# Patient Record
Sex: FEMALE | Race: OTHER | URBAN - METROPOLITAN AREA
[De-identification: names, ages, dates, MRNs, and addresses within clinical notes are randomized per-mention and may not be internally consistent; named-entity substitution may affect disease eponyms.]

---

## 2017-05-02 ENCOUNTER — EMERGENCY (EMERGENCY)
Facility: HOSPITAL | Age: 62
LOS: 1 days | Discharge: HOME | End: 2017-05-02
Attending: EMERGENCY MEDICINE | Admitting: EMERGENCY MEDICINE
Payer: COMMERCIAL

## 2017-05-02 VITALS
TEMPERATURE: 98 F | HEART RATE: 92 BPM | SYSTOLIC BLOOD PRESSURE: 174 MMHG | RESPIRATION RATE: 18 BRPM | DIASTOLIC BLOOD PRESSURE: 96 MMHG | OXYGEN SATURATION: 97 %

## 2017-05-02 VITALS
TEMPERATURE: 98 F | SYSTOLIC BLOOD PRESSURE: 145 MMHG | RESPIRATION RATE: 18 BRPM | DIASTOLIC BLOOD PRESSURE: 91 MMHG | HEART RATE: 72 BPM | OXYGEN SATURATION: 98 %

## 2017-05-02 DIAGNOSIS — I77.74 DISSECTION OF VERTEBRAL ARTERY: ICD-10-CM

## 2017-05-02 DIAGNOSIS — Z79.899 OTHER LONG TERM (CURRENT) DRUG THERAPY: ICD-10-CM

## 2017-05-02 DIAGNOSIS — R42 DIZZINESS AND GIDDINESS: ICD-10-CM

## 2017-05-02 DIAGNOSIS — R51 HEADACHE: ICD-10-CM

## 2017-05-02 LAB
ALBUMIN SERPL ELPH-MCNC: 4.1 G/DL — SIGNIFICANT CHANGE UP (ref 3.4–5)
ALP SERPL-CCNC: 62 U/L — SIGNIFICANT CHANGE UP (ref 40–120)
ALT FLD-CCNC: 27 U/L — SIGNIFICANT CHANGE UP (ref 12–42)
ANION GAP SERPL CALC-SCNC: 7 MMOL/L — LOW (ref 9–16)
APPEARANCE UR: CLEAR — SIGNIFICANT CHANGE UP
AST SERPL-CCNC: 19 U/L — SIGNIFICANT CHANGE UP (ref 15–37)
BASOPHILS NFR BLD AUTO: 0.8 % — SIGNIFICANT CHANGE UP (ref 0–2)
BILIRUB SERPL-MCNC: 0.7 MG/DL — SIGNIFICANT CHANGE UP (ref 0.2–1.2)
BILIRUB UR-MCNC: NEGATIVE — SIGNIFICANT CHANGE UP
BUN SERPL-MCNC: 11 MG/DL — SIGNIFICANT CHANGE UP (ref 7–23)
CALCIUM SERPL-MCNC: 8.8 MG/DL — SIGNIFICANT CHANGE UP (ref 8.5–10.5)
CHLORIDE SERPL-SCNC: 107 MMOL/L — SIGNIFICANT CHANGE UP (ref 96–108)
CO2 SERPL-SCNC: 26 MMOL/L — SIGNIFICANT CHANGE UP (ref 22–31)
COLOR SPEC: YELLOW — SIGNIFICANT CHANGE UP
CREAT SERPL-MCNC: 0.69 MG/DL — SIGNIFICANT CHANGE UP (ref 0.5–1.3)
D DIMER BLD IA.RAPID-MCNC: <150 NG/ML DDU — SIGNIFICANT CHANGE UP
DIFF PNL FLD: NEGATIVE — SIGNIFICANT CHANGE UP
EOSINOPHIL NFR BLD AUTO: 3.2 % — SIGNIFICANT CHANGE UP (ref 0–6)
EXTRA BLUE TOP TUBE: SIGNIFICANT CHANGE UP
EXTRA SST TUBE: SIGNIFICANT CHANGE UP
GLUCOSE SERPL-MCNC: 101 MG/DL — HIGH (ref 70–99)
GLUCOSE UR QL: NEGATIVE — SIGNIFICANT CHANGE UP
HCT VFR BLD CALC: 42.3 % — SIGNIFICANT CHANGE UP (ref 34.5–45)
HGB BLD-MCNC: 15.5 G/DL — SIGNIFICANT CHANGE UP (ref 11.5–15.5)
KETONES UR-MCNC: NEGATIVE — SIGNIFICANT CHANGE UP
LEUKOCYTE ESTERASE UR-ACNC: NEGATIVE — SIGNIFICANT CHANGE UP
LYMPHOCYTES # BLD AUTO: 26.8 % — SIGNIFICANT CHANGE UP (ref 13–44)
MCHC RBC-ENTMCNC: 30.8 PG — SIGNIFICANT CHANGE UP (ref 27–34)
MCHC RBC-ENTMCNC: 36.6 G/DL — HIGH (ref 32–36)
MCV RBC AUTO: 84.1 FL — SIGNIFICANT CHANGE UP (ref 80–100)
MONOCYTES NFR BLD AUTO: 7.6 % — SIGNIFICANT CHANGE UP (ref 2–14)
NEUTROPHILS NFR BLD AUTO: 61.6 % — SIGNIFICANT CHANGE UP (ref 43–77)
NITRITE UR-MCNC: NEGATIVE — SIGNIFICANT CHANGE UP
PH UR: 7 — SIGNIFICANT CHANGE UP (ref 5–8)
PLATELET # BLD AUTO: 257 K/UL — SIGNIFICANT CHANGE UP (ref 150–400)
POTASSIUM SERPL-MCNC: 3.4 MMOL/L — LOW (ref 3.5–5.3)
POTASSIUM SERPL-SCNC: 3.4 MMOL/L — LOW (ref 3.5–5.3)
PROT SERPL-MCNC: 7.3 G/DL — SIGNIFICANT CHANGE UP (ref 6.4–8.2)
PROT UR-MCNC: NEGATIVE MG/DL — SIGNIFICANT CHANGE UP
RBC # BLD: 5.03 M/UL — SIGNIFICANT CHANGE UP (ref 3.8–5.2)
RBC # FLD: 13.5 % — SIGNIFICANT CHANGE UP (ref 10.3–16.9)
SODIUM SERPL-SCNC: 140 MMOL/L — SIGNIFICANT CHANGE UP (ref 135–145)
SP GR SPEC: 1.01 — SIGNIFICANT CHANGE UP (ref 1–1.03)
UROBILINOGEN FLD QL: 0.2 E.U./DL — SIGNIFICANT CHANGE UP
WBC # BLD: 5 K/UL — SIGNIFICANT CHANGE UP (ref 3.8–10.5)
WBC # FLD AUTO: 5 K/UL — SIGNIFICANT CHANGE UP (ref 3.8–10.5)

## 2017-05-02 PROCEDURE — 80053 COMPREHEN METABOLIC PANEL: CPT

## 2017-05-02 PROCEDURE — 85379 FIBRIN DEGRADATION QUANT: CPT

## 2017-05-02 PROCEDURE — 70551 MRI BRAIN STEM W/O DYE: CPT

## 2017-05-02 PROCEDURE — 80061 LIPID PANEL: CPT

## 2017-05-02 PROCEDURE — 99284 EMERGENCY DEPT VISIT MOD MDM: CPT | Mod: 25

## 2017-05-02 PROCEDURE — 71010: CPT | Mod: 26

## 2017-05-02 PROCEDURE — 70450 CT HEAD/BRAIN W/O DYE: CPT | Mod: 26

## 2017-05-02 PROCEDURE — 84484 ASSAY OF TROPONIN QUANT: CPT

## 2017-05-02 PROCEDURE — 93005 ELECTROCARDIOGRAM TRACING: CPT

## 2017-05-02 PROCEDURE — 99285 EMERGENCY DEPT VISIT HI MDM: CPT

## 2017-05-02 PROCEDURE — 70551 MRI BRAIN STEM W/O DYE: CPT | Mod: 26

## 2017-05-02 PROCEDURE — 82550 ASSAY OF CK (CPK): CPT

## 2017-05-02 PROCEDURE — 70498 CT ANGIOGRAPHY NECK: CPT

## 2017-05-02 PROCEDURE — 70498 CT ANGIOGRAPHY NECK: CPT | Mod: 26

## 2017-05-02 PROCEDURE — 71045 X-RAY EXAM CHEST 1 VIEW: CPT

## 2017-05-02 PROCEDURE — 70450 CT HEAD/BRAIN W/O DYE: CPT

## 2017-05-02 PROCEDURE — 99285 EMERGENCY DEPT VISIT HI MDM: CPT | Mod: 25

## 2017-05-02 PROCEDURE — 93010 ELECTROCARDIOGRAM REPORT: CPT

## 2017-05-02 PROCEDURE — 81003 URINALYSIS AUTO W/O SCOPE: CPT

## 2017-05-02 PROCEDURE — 85025 COMPLETE CBC W/AUTO DIFF WBC: CPT

## 2017-05-02 PROCEDURE — 70496 CT ANGIOGRAPHY HEAD: CPT

## 2017-05-02 PROCEDURE — 82553 CREATINE MB FRACTION: CPT

## 2017-05-02 RX ORDER — ASPIRIN/CALCIUM CARB/MAGNESIUM 324 MG
1 TABLET ORAL
Qty: 0 | Refills: 0 | COMMUNITY

## 2017-05-02 NOTE — ED ADULT NURSE NOTE - CHPI ED SYMPTOMS NEG
no change in level of consciousness/no loss of consciousness/no vomiting/no blurred vision/no nausea/no confusion/no weakness/no numbness/no fever

## 2017-05-02 NOTE — ED ADULT TRIAGE NOTE - CHIEF COMPLAINT QUOTE
BIBA c/o headache and nausea. Pt reports symptoms started at 08:00 had the headache and quotes "I had a aura and then felt flush then my whole body was tingling." Pt reports had same symptoms last year for anxiety.

## 2017-05-02 NOTE — ED PROVIDER NOTE - MEDICAL DECISION MAKING DETAILS
63 yo female with prior hx of left VAD dx 1 year ago on ASA 81 mg a day- episode of dizziness and headache at 8 am x 15 min- sx resolved left VAD -noted ? chronic -- pseudoaneurysm noted  no intracranial extension- seen by neuro -agrees with MRI  if neg  may dc  continue ASA 63 yo female with prior hx of left VAD dx 1 year ago on ASA 81 mg a day- episode of dizziness and headache at 8 am x 15 min- sx resolved left VAD -noted ? chronic -- pseudoaneurysm noted  no intracranial extension- seen by neuro -agrees with MRI  if neg  may dc  continue ASA  MRI  no acute CVa  dw  dr john huynh dc  needs BP rechecked when she sees her pmd  continue ASA  therapy

## 2017-05-02 NOTE — ED ADULT NURSE REASSESSMENT NOTE - NS ED NURSE REASSESS COMMENT FT1
Pt lab results rvwd.  Abnormal values noted.  Pt states intermittent sensation of "faintness".  Safety precautions in place.  Pt left w/ call bell.  Pt pending radiologic examination.  Will continue to reassess and reevaluate.

## 2017-05-02 NOTE — ED PROVIDER NOTE - PROGRESS NOTE DETAILS
VAD on left C1-2  -pseudoaneuyrsm  non occlusive    dw  dr chun lopez to see pt  pt is on ASA q d  81 mg -acute change vs old finding VAD on left C1-2  -pseudoaneuyrsm  non occlusive    dw  dr chun lopez to see pt  pt is on ASA q d  81 mg -acute change vs old finding  stable for dc  MRI no acute ischemic findings dw dr lopez may be dced home ok to fly - return precautions d/w pt including any recurrent dizziness nausea or cp or sob-- neg d-dimer - do not suspect DVT or PE as part of pts other findings- no CP or dyspnea or dc neg trop and cpk

## 2017-05-02 NOTE — ED ADULT NURSE NOTE - OBJECTIVE STATEMENT
Pt w/ dx chronic vertebral dissection presents to ED today c/o new onset HA this morning.  Pt states she awoke with a HA and her face felt flushed.  Pt states she was afraid she might pass out, but denies LOC.  Pt states she had similar symptoms in the past, but was r/o for CVA or heart dx.  Pt tx'd herself with 2 chewable aspirin. Pt states the symptoms resolved, and she feels "much better."  Pt has steady gait and no neural deficits on exam.  Pt is PERRLA, EOMI and NCAT.  Pt recently traveled to [facility] by airplane from [place], and states she had mild bruising to her lower limbs bilaterally that has since resolved.  Pt denies CP, SOB, subjective illness, paresthesias, weakness, paralysis, N/V.  Pt is pending lab results and radiologic scan.

## 2017-05-02 NOTE — ED ADULT NURSE REASSESSMENT NOTE - NS ED NURSE REASSESS COMMENT FT1
Pt returned from CT.  Pt tolerated procedure well.  Pt states mild intermittent HA.  Denies need for analgesia.  Pt c/o thirst, provided with water.  Will continue to reassess and reevaluate.

## 2020-01-23 NOTE — CONSULT NOTE ADULT - ATTENDING COMMENTS
Pt seen and examined.      Normal neurologic exam.      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  Pt has measurements from imaging one year ago and reports pseudoaneurysm is unchanged.  I reviewed her CTA head/neck.  I reviewed her MRI brain which is negative for stroke.  The etiology of her vague symptoms this morning is unclear.  Doubt related to her dissection.  No further neurologic work-up at this time.  Continue daily baby aspirin.  Needs follow-up with vascular neurology in her hometown.  All imaging reports given to the patient.  Needs cardiology follow-up for hypertension and two episodes of chest pain and weekly lightheadedness.  Doubt related to her dissection. Alert and oriented to person, place and time

## 2020-04-10 NOTE — ED ADULT NURSE NOTE - PERIPHERAL VASCULAR WDL
You can access the FollowMyHealth Patient Portal offered by [facility] by registering at the following website: [email or web address]. By joining [facility]’s FollowMyHealth portal, you will also be able to view your health information using other applications (apps) compatible with our system. Pulses equal bilaterally, no edema present.

## 2023-07-27 ENCOUNTER — APPOINTMENT (RX ONLY)
Dept: URBAN - METROPOLITAN AREA CLINIC 133 | Facility: CLINIC | Age: 68
Setting detail: DERMATOLOGY
End: 2023-07-27

## 2023-07-27 DIAGNOSIS — B07.8 OTHER VIRAL WARTS: ICD-10-CM

## 2023-07-27 DIAGNOSIS — D22 MELANOCYTIC NEVI: ICD-10-CM

## 2023-07-27 PROBLEM — D22.5 MELANOCYTIC NEVI OF TRUNK: Status: ACTIVE | Noted: 2023-07-27

## 2023-07-27 PROCEDURE — ? OBSERVATION

## 2023-07-27 PROCEDURE — ? COUNSELING

## 2023-07-27 PROCEDURE — 99203 OFFICE O/P NEW LOW 30 MIN: CPT

## 2023-07-27 PROCEDURE — ? ADDITIONAL NOTES

## 2023-07-27 ASSESSMENT — LOCATION DETAILED DESCRIPTION DERM
LOCATION DETAILED: RIGHT PLANTAR FOREFOOT OVERLYING 1ST METATARSAL
LOCATION DETAILED: LEFT BUTTOCK

## 2023-07-27 ASSESSMENT — LOCATION ZONE DERM
LOCATION ZONE: TRUNK
LOCATION ZONE: FEET

## 2023-07-27 ASSESSMENT — LOCATION SIMPLE DESCRIPTION DERM
LOCATION SIMPLE: BUTTOCK
LOCATION SIMPLE: RIGHT PLANTAR SURFACE

## 2023-07-27 NOTE — PROCEDURE: ADDITIONAL NOTES
Detail Level: Simple
Additional Notes: Pt is seeing podiatrist \\nHas done topical chemotherapy but podiatrist is now suggesting Candida injection, discussed to continue with podiatrist treatment
Render Risk Assessment In Note?: no

## 2023-07-27 NOTE — HPI: SKIN LESION
What Type Of Note Output Would You Prefer (Optional)?: Standard Output
How Severe Is Your Skin Lesion?: mild
Has Your Skin Lesion Been Treated?: not been treated
Is This A New Presentation, Or A Follow-Up?: Skin Lesion
Additional History: Per pt has had since young adulthood \\nLesion has been monitored by previous Dr. Contreras (dermatologist) she wanted to bx lesion but to does not think it’s necessary

## 2023-07-27 NOTE — PROCEDURE: OBSERVATION
Detail Level: Detailed
Size Of Lesion In Cm (Optional): 0.2
X Size Of Lesion In Cm (Optional): 0
Morphology Per Location (Optional): Light brown papule

## 2023-11-29 ENCOUNTER — APPOINTMENT (RX ONLY)
Dept: URBAN - METROPOLITAN AREA CLINIC 133 | Facility: CLINIC | Age: 68
Setting detail: DERMATOLOGY
End: 2023-11-29

## 2023-11-29 DIAGNOSIS — Z85.828 PERSONAL HISTORY OF OTHER MALIGNANT NEOPLASM OF SKIN: ICD-10-CM

## 2023-11-29 DIAGNOSIS — Z71.89 OTHER SPECIFIED COUNSELING: ICD-10-CM

## 2023-11-29 DIAGNOSIS — D22 MELANOCYTIC NEVI: ICD-10-CM

## 2023-11-29 DIAGNOSIS — D485 NEOPLASM OF UNCERTAIN BEHAVIOR OF SKIN: ICD-10-CM

## 2023-11-29 DIAGNOSIS — D18.0 HEMANGIOMA: ICD-10-CM

## 2023-11-29 DIAGNOSIS — L82.1 OTHER SEBORRHEIC KERATOSIS: ICD-10-CM

## 2023-11-29 DIAGNOSIS — L81.4 OTHER MELANIN HYPERPIGMENTATION: ICD-10-CM

## 2023-11-29 PROBLEM — D22.5 MELANOCYTIC NEVI OF TRUNK: Status: ACTIVE | Noted: 2023-11-29

## 2023-11-29 PROBLEM — D18.01 HEMANGIOMA OF SKIN AND SUBCUTANEOUS TISSUE: Status: ACTIVE | Noted: 2023-11-29

## 2023-11-29 PROBLEM — D48.5 NEOPLASM OF UNCERTAIN BEHAVIOR OF SKIN: Status: ACTIVE | Noted: 2023-11-29

## 2023-11-29 PROCEDURE — ? OBSERVATION

## 2023-11-29 PROCEDURE — 11104 PUNCH BX SKIN SINGLE LESION: CPT

## 2023-11-29 PROCEDURE — 99213 OFFICE O/P EST LOW 20 MIN: CPT | Mod: 25

## 2023-11-29 PROCEDURE — ? BIOPSY BY PUNCH METHOD

## 2023-11-29 PROCEDURE — ? COUNSELING

## 2023-11-29 ASSESSMENT — LOCATION DETAILED DESCRIPTION DERM
LOCATION DETAILED: RIGHT ANTERIOR PROXIMAL UPPER ARM
LOCATION DETAILED: LEFT CENTRAL EYEBROW
LOCATION DETAILED: INFERIOR THORACIC SPINE
LOCATION DETAILED: LEFT CENTRAL ZYGOMA
LOCATION DETAILED: SUPERIOR THORACIC SPINE
LOCATION DETAILED: RIGHT MEDIAL UPPER BACK
LOCATION DETAILED: SUPERIOR LUMBAR SPINE

## 2023-11-29 ASSESSMENT — LOCATION ZONE DERM
LOCATION ZONE: TRUNK
LOCATION ZONE: ARM
LOCATION ZONE: FACE

## 2023-11-29 ASSESSMENT — LOCATION SIMPLE DESCRIPTION DERM
LOCATION SIMPLE: RIGHT UPPER ARM
LOCATION SIMPLE: LOWER BACK
LOCATION SIMPLE: UPPER BACK
LOCATION SIMPLE: LEFT EYEBROW
LOCATION SIMPLE: LEFT ZYGOMA
LOCATION SIMPLE: RIGHT UPPER BACK

## 2023-11-29 NOTE — PROCEDURE: BIOPSY BY PUNCH METHOD

## 2023-12-04 ENCOUNTER — APPOINTMENT (RX ONLY)
Dept: URBAN - METROPOLITAN AREA CLINIC 133 | Facility: CLINIC | Age: 68
Setting detail: DERMATOLOGY
End: 2023-12-04

## 2023-12-04 DIAGNOSIS — Z48.02 ENCOUNTER FOR REMOVAL OF SUTURES: ICD-10-CM

## 2023-12-04 PROCEDURE — 99024 POSTOP FOLLOW-UP VISIT: CPT

## 2023-12-04 PROCEDURE — ? SUTURE REMOVAL (GLOBAL PERIOD)

## 2023-12-04 ASSESSMENT — LOCATION DETAILED DESCRIPTION DERM: LOCATION DETAILED: LEFT CENTRAL EYEBROW

## 2023-12-04 ASSESSMENT — LOCATION ZONE DERM: LOCATION ZONE: FACE

## 2023-12-04 ASSESSMENT — LOCATION SIMPLE DESCRIPTION DERM: LOCATION SIMPLE: LEFT EYEBROW

## 2023-12-04 NOTE — PROCEDURE: SUTURE REMOVAL (GLOBAL PERIOD)
Detail Level: Detailed
Add 85010 Cpt? (Important Note: In 2017 The Use Of 24300 Is Being Tracked By Cms To Determine Future Global Period Reimbursement For Global Periods): yes

## 2024-01-19 ENCOUNTER — APPOINTMENT (RX ONLY)
Dept: URBAN - METROPOLITAN AREA CLINIC 133 | Facility: CLINIC | Age: 69
Setting detail: DERMATOLOGY
End: 2024-01-19

## 2024-01-19 DIAGNOSIS — L82.1 OTHER SEBORRHEIC KERATOSIS: ICD-10-CM

## 2024-01-19 DIAGNOSIS — L57.0 ACTINIC KERATOSIS: ICD-10-CM

## 2024-01-19 PROCEDURE — ? ADDITIONAL NOTES

## 2024-01-19 PROCEDURE — 99212 OFFICE O/P EST SF 10 MIN: CPT

## 2024-01-19 PROCEDURE — ? COUNSELING

## 2024-01-19 ASSESSMENT — LOCATION SIMPLE DESCRIPTION DERM
LOCATION SIMPLE: LEFT HAND
LOCATION SIMPLE: LEFT EYEBROW

## 2024-01-19 ASSESSMENT — LOCATION DETAILED DESCRIPTION DERM
LOCATION DETAILED: LEFT CENTRAL EYEBROW
LOCATION DETAILED: LEFT RADIAL DORSAL HAND

## 2024-01-19 ASSESSMENT — LOCATION ZONE DERM
LOCATION ZONE: FACE
LOCATION ZONE: HAND

## 2024-01-19 NOTE — PROCEDURE: ADDITIONAL NOTES
Render Risk Assessment In Note?: no
Detail Level: Simple
Additional Notes: Resolved with biopsy, no further treatment needed.

## 2024-04-30 NOTE — CONSULT NOTE ADULT - SUBJECTIVE AND OBJECTIVE BOX
Vascular Neurology Consultation    HPI: 62 year old right handed female, traveling to [facility] from [place] since Thursday, with pre-hypertension (not on medications), presents today with transient severe headache, nausea, and "flushing" sensation throughout arms and legs bilaterally. Reports waking up at 8 am today with severe headache, posterior throbbing, radiating towards front of head. Associated with nausea but no vomiting. Got up from lying position and then felt lightheaded, but no room spinning sensation. Then had "flushing and tingling sensation" throughout arms and legs bilaterally, "felt like an aura." Denies confusion, blurred vision, diplopia, difficulty word finding, slurred speech, clumsiness, unilateral weakness, or gait imbalance at the time. Pt reports she had similar symptoms 2016 for which she had a neurological work up. MRI head at the time negative for stroke, but incidentally found to have chronic dissection of left vertebral artery. Neurologist at the time felt that it was chronic for >20 years and prescribed aspirin 81 mg. Is compliant with her aspirin daily, but did miss 2 days last week due to traveling.     Of note, pt also reports on Saturday noticed that she had bilateral calf "strip of bruising" but denies trauma. Reports she easily bruises at baseline. On , for a few seconds felt that she had trouble taking in a breath and felt a pain in the middle of her back. Given symptoms over the weekend, pt decided to get medical attention at Urgent care facility, but no intervention was warranted. Denies recent fevers, chills, weight loss, sick contacts. Denies chest pain or palpitations.     PAST MEDICAL & SURGICAL HISTORY:  Chronic left Vertebral artery dissection  Osteopenia     FAMILY HISTORY:  Father  at age 82 years old from CVA  Mother with HTN and CHF, alive at 87 years old    Social History: Lives with  in [place]. Had plans to fly to [place] today for a few more days of vacation.  Independent of all ADLs. Does not walk with assistive device. 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 Drinks 1-2 glasses a wine per day. Denies illicit drug use.     Review of Systems:  Constitutional: No fever, weight loss or fatigue  Eyes: No eye pain, visual disturbances, or discharge  ENMT:  No difficulty hearing, tinnitus, vertigo; No sinus or throat pain  Neck: No pain or stiffness  Respiratory: No cough, wheezing, chills or hemoptysis  Cardiovascular: No chest pain, palpitations, shortness of breath, dizziness or leg swelling  Gastrointestinal: No abdominal pain. No nausea, vomiting or hematemesis; No diarrhea or constipation.  Genitourinary: No dysuria, frequency, hematuria or incontinence  Neurological: As per HPI  Skin: No itching, burning, rashes or lesions   Endocrine: No heat or cold intolerance; No hair loss  Musculoskeletal: No joint pain or swelling; No muscle, back or extremity pain  Psychiatric: No depression, anxiety, mood swings or difficulty sleeping  Heme/Lymph: No easy bruising or bleeding gums    Allergies: NKDA     Vital Signs Last 24 Hrs  T(C): 36.8, Max: 36.8 ( @ 10:02)  T(F): 98.2, Max: 98.2 ( @ 10:02)  HR: 68 (68 - 92)  BP: 154/84 (154/84 - 174/96)  RR: 18 (18 - 18)  SpO2: 99% (97% - 99%)    Gen: No acute distress, well-nourished, calm, cooperative, pleasant  Neuro:  Mental status: Awake, alert and oriented x3.  Recent and remote memory intact.  Naming, repetition and comprehension intact.  Attention/concentration intact.  No dysarthria, no aphasia.  Fund of knowledge appropriate.    Cranial nerves: pupils equally round and reactive to light, 3 mm, visual fields full, no nystagmus, extraocular muscles intact, V1 through V3 intact bilaterally and symmetric, face symmetric, hearing intact to finger rub, palate elevation symmetric, tongue was midline, sternocleidomastoid/shoulder shrug strength bilaterally 5/5.    Motor:  Normal bulk and tone, strength 5/5 in bilateral upper and lower extremities.   strength strong bilaterally.  Rapid alternating movements intact and symmetric.   Sensation: Intact to light touch, proprioception, vibration, temperature, pinprick.  No neglect.   Coordination: No dysmetria on finger-to-nose and heel-to-shin.  No clumsiness.  Reflexes: 2+ in upper and lower extremities, absent Babinski bilaterally    NIHSS: 0    Labs:                        15.5   5.0   )-----------( 257      ( 02 May 2017 11:02 )             42.3         140  |  107  |  11  ----------------------------<  101<H>  3.4<L>   |  26  |  0.69    Ca    8.8      02 May 2017 11:02    TPro  7.3  /  Alb  4.1  /  TBili  0.7  /  DBili  x   /  AST  19  /  ALT  27  /  AlkPhos  62        Cholesterol, Serum: 197 mg/dL ( @ 11:02)  HDL Cholesterol, Serum: 75 mg/dL ( @ 11:02)  Triglycerides, Serum: 88 mg/dL ( @ 11:02)      Radiology and Additional Studies:   CT head:   Impression: Normal CT of the brain.     CTA head:   Nondiagnostic CTA examination of the brain.     CTA neck:   IMPRESSION:    Left vertebral artery dissection atthe V3 segment appears nonocclusive   and perhaps chronic, and does NOT extend intracranially. The right   vertebral artery in the neck is patent and normal caliber.    No evidence of cervical ICA dissection or steno-occlusive disease. The   skull base segments of the ICAs are poorly assessed on this exam.    Assessment & Plan: Vascular Neurology Consultation    HPI: 62 year old right handed female, traveled to [facility] from [place] on Thursday, with PMH of pre-hypertension (not on medications), presents today with transient severe headache, nausea, and "flushing/tingling" sensation throughout arms and legs bilaterally. Reports waking up at 8 am today with severe headache, posterior throbbing, radiating towards front of head. Associated with nausea but no vomiting. Got up from lying position and then felt lightheaded, but no room spinning sensation. Then had "flushing and tingling sensation" throughout arms and legs bilaterally, "felt like an aura." Lasted approximately 45 minutes. Resolved upon arrival to ED. Denies confusion, blurred vision, diplopia, difficulty word finding, slurred speech, clumsiness, unilateral weakness, or gait imbalance at the time. Does have intermittent tension like headaches, but no diagnosis of migraines. Pt reports she had similar symptoms 2016 for which she had a neurological work up. MRI head at the time negative for stroke, but incidentally found to have chronic dissection of left vertebral artery. Neurologist at the time felt that it was chronic for >20 years and prescribed aspirin 81 mg. Is compliant with her aspirin daily, but did miss 2 days last week due to traveling.     Of note, pt also reports on Saturday noticed that she had bilateral calf "strip of bruising" but denies trauma. Reports she easily bruises at baseline. On , for a few seconds felt that she had trouble taking in a breath and felt a pain in the middle of her back. Given symptoms over the weekend, pt decided to get medical attention at Urgent care facility, but no intervention was warranted. Denies recent fevers, chills, weight loss, sick contacts. Denies chest pain or palpitations.     Pt also notes she has intermittent "slanted feeling" approximately once a month since last year, where she feels as if the ground is slanted and she needs to hold onto the walls momentarily. Denies room spinning sensation. Was evaluated by PCP and was worked up for Meniere's disease, which was negative. Was diagnosed with vertigo.     V/S in ED on arrival: /96, HR 92, Temp 98.2    PAST MEDICAL & SURGICAL HISTORY:  Chronic left Vertebral artery dissection  Osteopenia     FAMILY HISTORY:  Father  at age 82 years old from CVA  Mother with HTN and CHF, alive at 87 years old    Social History: Lives with  in [place]. Had plans to fly to [place] today for a few more days of vacation.  Independent of all ADLs. Does not walk with assistive device. 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 Drinks 1-2 glasses a wine per day. Denies illicit drug use. Active lifestyle - exercises regularly. Eats healthy. Reports recent stress due to financial circumstances.     Medications:   Aspirin 81 mg daily  Vitamin D  Calcium   Fish oil-recently stopped     Review of Systems:  Constitutional: No fever, weight loss or fatigue  Eyes: No eye pain, visual disturbances, or discharge. Is sensitive to bright light and loud noises at baseline.   ENMT:  No difficulty hearing, tinnitus, vertigo; No sinus or throat pain  Neck: No pain or stiffness  Respiratory: No cough, wheezing, chills or hemoptysis  Cardiovascular: No chest pain, palpitations, shortness of breath, dizziness or leg swelling  Gastrointestinal: No abdominal pain. No nausea, vomiting or hematemesis; No diarrhea or constipation.  Genitourinary: No dysuria, frequency, hematuria or incontinence  Neurological: As per HPI  Skin: No itching, burning, rashes or lesions   Endocrine: No heat or cold intolerance; No hair loss  Musculoskeletal: No joint pain or swelling; No muscle, back or extremity pain  Psychiatric: No depression, anxiety, mood swings or difficulty sleeping  Heme/Lymph: No easy bruising or bleeding gums    Allergies: NKDA     Vital Signs Last 24 Hrs  T(C): 36.8, Max: 36.8 ( @ 10:02)  T(F): 98.2, Max: 98.2 ( @ 10:02)  HR: 68 (68 - 92)  BP: 154/84 (154/84 - 174/96)  RR: 18 (18 - 18)  SpO2: 99% (97% - 99%)    Gen: No acute distress, well-nourished, calm, cooperative, pleasant  Ext: no bruising, redness, swelling, or warmth noted of bilateral lower extremities; pulses are intact and palpable   Neuro:  Mental status: Awake, alert and oriented x3.  Recent and remote memory intact.  Naming, repetition and comprehension intact.  Attention/concentration intact.  No dysarthria, no aphasia.  Fund of knowledge appropriate.    Cranial nerves: pupils equally round and reactive to light, 3 mm, visual fields full, no nystagmus, extraocular muscles intact, V1 through V3 intact bilaterally and symmetric, face symmetric, hearing intact to finger rub, palate elevation symmetric, tongue was midline, sternocleidomastoid/shoulder shrug strength bilaterally 5/5.    Motor:  Normal bulk and tone, strength 5/5 in bilateral upper and lower extremities.   strength strong bilaterally.  Rapid alternating movements intact and symmetric.   Sensation: Intact to light touch, proprioception, vibration, temperature, pinprick.  No neglect.   Coordination: No dysmetria on finger-to-nose and heel-to-shin.  No clumsiness.  Reflexes: 2+ in upper and lower extremities, absent Babinski bilaterally  Gait: deferred at this time    NIHSS: 0    Labs:                        15.5   5.0   )-----------( 257      ( 02 May 2017 11:02 )             42.3         140  |  107  |  11  ----------------------------<  101<H>  3.4<L>   |  26  |  0.69    Ca    8.8      02 May 2017 11:02    TPro  7.3  /  Alb  4.1  /  TBili  0.7  /  DBili  x   /  AST  19  /  ALT  27  /  AlkPhos  62        Cholesterol, Serum: 197 mg/dL ( @ 11:02)  HDL Cholesterol, Serum: 75 mg/dL ( @ 11:02)  Triglycerides, Serum: 88 mg/dL ( @ 11:02)      Radiology and Additional Studies:   CT head:   Impression: Normal CT of the brain.     CTA head:   Nondiagnostic CTA examination of the brain.     CTA neck:   IMPRESSION:    Left vertebral artery dissection at the V3 segment appears nonocclusive   and perhaps chronic, and does NOT extend intracranially. The right   vertebral artery in the neck is patent and normal caliber.    No evidence of cervical ICA dissection or steno-occlusive disease. The   skull base segments of the ICAs are poorly assessed on this exam.    Assessment & Plan: Vascular Neurology Consultation    HPI: 62 year old right handed female, traveled to [facility] from [place] on Thursday, with PMH of pre-hypertension (not on medications), left vertebral artery chronic dissection incidentally found on 2016, osteopenia, and bulging discs of C4-C6,  presents today with transient severe headache, nausea, and "flushing/tingling" sensation throughout arms and legs bilaterally. Reports waking up at 8 am today with severe headache, posterior head with throbbing, radiating towards front of head. Associated with nausea but no vomiting. Got up from lying position and then felt lightheaded, but no room spinning sensation. Then had "flushing and tingling sensation" throughout arms and legs bilaterally, "felt like an aura." Lasted approximately 45 minutes. Resolved upon arrival to ED. Denies confusion, blurred vision, diplopia, difficulty word finding, slurred speech, clumsiness, unilateral weakness, or gait imbalance at the time. Does have intermittent tension like headaches, but no previous diagnosis of migraines. Pt reports she had similar symptoms of severe headache, nausea, flushing/tingling sensation throughout extremities in 2016 for which she had a neurological work up. MRI head at the time negative for stroke, but incidentally found to have chronic dissection of left vertebral artery. Neurologist at the time felt that it was chronic for >20 years, likely from years of horseback riding and skiing. Was prescribed aspirin 81 mg. Is compliant with her aspirin daily, but did miss 2 days last week due to traveling. Took 2 baby aspirins today prior to arrival to ED.     Since her work up in 2016, pt notes she has intermittent "slanted feeling" approximately once a month, where she feels as if the ground is slanted and she needs to hold onto the walls momentarily. Denies room spinning sensation. Denies dizziness, lightheadedness, weakness at those times. Was evaluated by PCP and was worked up for Meniere's disease, which was negative. Was diagnosed with vertigo. Recently had routine check up with PCP 2 weeks ago. Pt expressed concerns that she should be on an antihypertensive, but was not prescribed one by PCP given normal measurements in the office. Was previously on antihypertensives in 2016 but had subsequent hypotension, so was taken off of them. Has a BP machine at home, at times there are readings where SBP has been 160's-170's but not consistently.     Of note, pt also reports on Saturday noticed that bilateral calf with "strip of bruising" but denies trauma. Reports she easily bruises at baseline. On , for a few seconds felt that she had trouble taking in a breath and felt a pain in the middle of her back. Given symptoms over the weekend, pt decided to get medical attention at Urgent care facility, but no intervention was warranted. Denies recent fevers, chills, weight loss, sick contacts. Denies chest pain or palpitations.     V/S in ED on arrival: /96, HR 92, Temp 98.2, RR 18, SpO2 97%    PAST MEDICAL & SURGICAL HISTORY:  Chronic left Vertebral artery dissection  Osteopenia     FAMILY HISTORY:  Father  at age 82 years old from CVA  Mother with HTN and CHF, alive at 87 years old    Social History: Lives with  in [place]. Had plans to fly to [place] today for a few more days of vacation.  Independent of all ADLs. Does not walk with assistive device. 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 Drinks 1-2 glasses a wine per day. Denies illicit drug use. Active lifestyle - exercises regularly. Eats healthy. Reports recent stress due to financial circumstances.     Medications:   Aspirin 81 mg daily  Vitamin D  Calcium   Fish oil-recently stopped     Review of Systems:  Constitutional: No fever, weight loss or fatigue  Eyes: No eye pain, visual disturbances, or discharge. Is sensitive to bright light and loud noises at baseline.   ENMT:  No difficulty hearing, tinnitus, vertigo; No sinus or throat pain  Neck: No pain or stiffness  Respiratory: No cough, wheezing, chills or hemoptysis  Cardiovascular: No chest pain, palpitations, shortness of breath, dizziness or leg swelling  Gastrointestinal: No abdominal pain. No nausea, vomiting or hematemesis; No diarrhea or constipation.  Genitourinary: No dysuria, frequency, hematuria or incontinence  Neurological: As per HPI  Skin: No itching, burning, rashes or lesions   Endocrine: No heat or cold intolerance; No hair loss  Musculoskeletal: No joint pain or swelling; No muscle, back or extremity pain  Psychiatric: No depression, anxiety, mood swings or difficulty sleeping  Heme/Lymph: No easy bruising or bleeding gums    Allergies: NKDA     Vital Signs Last 24 Hrs  T(C): 36.8, Max: 36.8 ( @ 10:02)  T(F): 98.2, Max: 98.2 ( @ 10:02)  HR: 68 (68 - 92)  BP: 154/84 (154/84 - 174/96)  RR: 18 (18 - 18)  SpO2: 99% (97% - 99%)    Gen: No acute distress, well-nourished, calm, cooperative, pleasant  Ext: no bruising, redness, swelling, or warmth noted of bilateral lower extremities; pulses are intact and palpable   Neuro:  Mental status: Awake, alert and oriented x3.  Recent and remote memory intact.  Naming, repetition and comprehension intact.  Attention/concentration intact.  No dysarthria, no aphasia.  Fund of knowledge appropriate.    Cranial nerves: pupils equally round and reactive to light, 3 mm, visual fields full, no nystagmus, extraocular muscles intact, V1 through V3 intact bilaterally and symmetric, face symmetric, hearing intact to finger rub, palate elevation symmetric, tongue was midline, sternocleidomastoid/shoulder shrug strength bilaterally 5/5.    Motor:  Normal bulk and tone, strength 5/5 in bilateral upper and lower extremities.   strength strong bilaterally.  Rapid alternating movements intact and symmetric.   Sensation: Intact to light touch, proprioception, vibration, temperature, pinprick.  No neglect.   Coordination: No dysmetria on finger-to-nose and heel-to-shin.  No clumsiness.  Reflexes: 2+ in upper and lower extremities, absent Babinski bilaterally  Gait: deferred at this time    NIHSS: 0    Labs:                        15.5   5.0   )-----------( 257      ( 02 May 2017 11:02 )             42.3         140  |  107  |  11  ----------------------------<  101<H>  3.4<L>   |  26  |  0.69    Ca    8.8      02 May 2017 11:02    TPro  7.3  /  Alb  4.1  /  TBili  0.7  /  DBili  x   /  AST  19  /  ALT  27  /  AlkPhos  62        Cholesterol, Serum: 197 mg/dL ( @ 11:02)  HDL Cholesterol, Serum: 75 mg/dL ( @ 11:02)  Triglycerides, Serum: 88 mg/dL ( @ 11:02)      Radiology and Additional Studies:   CT head:   Impression: Normal CT of the brain.     CTA head:   Nondiagnostic CTA examination of the brain.     CTA neck:   IMPRESSION:    Left vertebral artery dissection at the V3 segment appears nonocclusive   and perhaps chronic, and does NOT extend intracranially. The right   vertebral artery in the neck is patent and normal caliber.    No evidence of cervical ICA dissection or steno-occlusive disease. The   skull base segments of the ICAs are poorly assessed on this exam.    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 Had similar symptoms in 2016 where she underwent neurological work up with MRI head which was negative for stroke, but was incidentally found to have chronic left vertebral artery dissection. Was prescribed daily baby aspirin since and has been compliant with exception of missing two doses last week due to traveling. Otherwise with further vague symptoms of bruising of bilateral lower calves since Saturday which has now resolved, and seconds of trouble taking a breath in on . Reports feeling mildly anxious at this time because her  returned home yesterday and now she's alone in the city and doesn't know anyone. Currently without focal deficits on neurological exam. Pt reports very mild headache at this time, but otherwise at her baseline.     -CT head without acute ischemic infarcts or hemorrhage, unremarkable study  -CTA head without large vessel stenosis or occlusion  -CTA neck with known left vertebral artery dissection at the V3 segment appears nonocclusive and perhaps chronic, and does not extend intracranially. The right  vertebral artery without stenosis or occlusion.   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 Reports waking up at 8 am today with severe headache, posterior head with throbbing, radiating towards front of head. Associated with nausea but no vomiting. Got up from lying position and then felt lightheaded, but no room spinning sensation. Then had "flushing and tingling sensation" throughout arms and legs bilaterally, "felt like an aura." Lasted approximately 45 minutes. Resolved upon arrival to ED. Denies confusion, blurred vision, diplopia, difficulty word finding, slurred speech, clumsiness, unilateral weakness, or gait imbalance at the time. Does have intermittent tension like headaches, but no previous diagnosis of migraines. Pt reports she had similar symptoms of severe headache, nausea, flushing/tingling sensation throughout extremities in 2016 for which she had a neurological work up. MRI head at the time negative for stroke, but incidentally found to have chronic dissection of left vertebral artery. Neurologist at the time felt that it was chronic for >20 years, likely from years of horseback riding and skiing. Was prescribed aspirin 81 mg. Is compliant with her aspirin daily, but did miss 2 days last week due to traveling. Took 2 baby aspirins today prior to arrival to ED.     Since her work up in 2016, pt notes she has intermittent "slanted feeling" approximately once a month, where she feels as if the ground is slanted and she needs to hold onto the walls momentarily. Denies room spinning sensation. Denies dizziness, lightheadedness, weakness at those times. Was evaluated by PCP and was worked up for Meniere's disease, which was negative. Was diagnosed with vertigo. Recently had routine check up with PCP 2 weeks ago. Pt expressed concerns that she should be on an antihypertensive, but was not prescribed one by PCP given normal measurements in the office. Was previously on antihypertensives in 2016 but had subsequent hypotension, so was taken off of them. Has a BP machine at home, at times there are readings where SBP has been 160's-170's but not consistently.     Of note, pt also reports on Saturday noticed that bilateral calf with "strip of bruising" but denies trauma. Reports she easily bruises at baseline. On , for a few seconds felt that she had trouble taking in a breath and felt a pain in the middle of her back. Given symptoms over the weekend, pt decided to get medical attention at Urgent care facility, but no intervention was warranted. Denies recent fevers, chills, weight loss, sick contacts. Denies chest pain or palpitations.     V/S in ED on arrival: /96, HR 92, Temp 98.2, RR 18, SpO2 97%    PAST MEDICAL & SURGICAL HISTORY:  Chronic left Vertebral artery dissection  Osteopenia     FAMILY HISTORY:  Father  at age 82 years old from CVA  Mother with HTN and CHF, alive at 87 years old    Social History: Lives with  in [place]. Had plans to fly to [place] today for a few more days of vacation.  Independent of all ADLs. Does not walk with assistive device. 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 Drinks 1-2 glasses a wine per day. Denies illicit drug use. Active lifestyle - exercises regularly. Eats healthy. Reports recent stress due to financial circumstances.     Medications:   Aspirin 81 mg daily  Vitamin D  Calcium   Fish oil-recently stopped     Review of Systems:  Constitutional: No fever, weight loss or fatigue  Eyes: No eye pain, visual disturbances, or discharge. Is sensitive to bright light and loud noises at baseline.   ENMT:  No difficulty hearing, tinnitus, vertigo; No sinus or throat pain  Neck: No pain or stiffness  Respiratory: No cough, wheezing, chills or hemoptysis  Cardiovascular: No chest pain, palpitations, shortness of breath, dizziness or leg swelling  Gastrointestinal: No abdominal pain. No nausea, vomiting or hematemesis; No diarrhea or constipation.  Genitourinary: No dysuria, frequency, hematuria or incontinence  Neurological: As per HPI  Skin: No itching, burning, rashes or lesions   Endocrine: No heat or cold intolerance; No hair loss  Musculoskeletal: No joint pain or swelling; No muscle, back or extremity pain  Psychiatric: No depression, anxiety, mood swings or difficulty sleeping  Heme/Lymph: No easy bruising or bleeding gums    Allergies: NKDA     Vital Signs Last 24 Hrs  T(C): 36.8, Max: 36.8 ( @ 10:02)  T(F): 98.2, Max: 98.2 ( @ 10:02)  HR: 68 (68 - 92)  BP: 154/84 (154/84 - 174/96)  RR: 18 (18 - 18)  SpO2: 99% (97% - 99%)    Gen: No acute distress, well-nourished, calm, cooperative, pleasant  Ext: no bruising, redness, swelling, or warmth noted of bilateral lower extremities; pulses are intact and palpable   Neuro:  Mental status: Awake, alert and oriented x3.  Recent and remote memory intact.  Naming, repetition and comprehension intact.  Attention/concentration intact.  No dysarthria, no aphasia.  Fund of knowledge appropriate.    Cranial nerves: pupils equally round and reactive to light, 3 mm, visual fields full, no nystagmus, extraocular muscles intact, V1 through V3 intact bilaterally and symmetric, face symmetric, hearing intact to finger rub, palate elevation symmetric, tongue was midline, sternocleidomastoid/shoulder shrug strength bilaterally 5/5.    Motor:  Normal bulk and tone, strength 5/5 in bilateral upper and lower extremities.   strength strong bilaterally.  Rapid alternating movements intact and symmetric.   Sensation: Intact to light touch, proprioception, vibration, temperature, pinprick.  No neglect.   Coordination: No dysmetria on finger-to-nose and heel-to-shin.  No clumsiness.  Reflexes: 2+ in upper and lower extremities, absent Babinski bilaterally  Gait: deferred at this time    NIHSS: 0    Labs:                        15.5   5.0   )-----------( 257      ( 02 May 2017 11:02 )             42.3         140  |  107  |  11  ----------------------------<  101<H>  3.4<L>   |  26  |  0.69    Ca    8.8      02 May 2017 11:02    TPro  7.3  /  Alb  4.1  /  TBili  0.7  /  DBili  x   /  AST  19  /  ALT  27  /  AlkPhos  62        Cholesterol, Serum: 197 mg/dL ( @ 11:02)  HDL Cholesterol, Serum: 75 mg/dL ( @ 11:02)  Triglycerides, Serum: 88 mg/dL ( @ 11:02)      Radiology and Additional Studies:   CT head:   Impression: Normal CT of the brain.     CTA head:   Nondiagnostic CTA examination of the brain.     CTA neck:   IMPRESSION:    Left vertebral artery dissection at the V3 segment appears nonocclusive   and perhaps chronic, and does NOT extend intracranially. The right   vertebral artery in the neck is patent and normal caliber.    No evidence of cervical ICA dissection or steno-occlusive disease. The   skull base segments of the ICAs are poorly assessed on this exam.    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 Had similar symptoms in 2016 where she underwent neurological work up with MRI head which was negative for stroke, but was incidentally found to have chronic left vertebral artery dissection. Was prescribed daily baby aspirin since and has been compliant with exception of missing two doses last week due to traveling. Otherwise with further vague symptoms of bruising of bilateral lower calves since Saturday which has now resolved, and seconds of trouble taking a breath in on . Reports feeling mildly anxious at this time because her  returned home yesterday and now she's alone in the city and doesn't know anyone. Currently without focal deficits on neurological exam. Pt reports very mild headache at this time, but otherwise at her baseline.     -CT head without acute ischemic infarcts or hemorrhage, unremarkable study  -CTA head without large vessel stenosis or occlusion  -CTA neck with known left vertebral artery dissection at the V3 segment appears nonocclusive and perhaps chronic, and does not extend intracranially. The right  vertebral artery without stenosis or occlusion.   -pending MRI head w/o contrast to r/o stroke, although suspicion is low given stable neurological exam   -headache could perhaps be secondary to hypertension and anxiety   -would continue aspirin 81 mg for primary stroke prevention  -, reasonable well controlled on no medications, no need for statin at this time  -passed dysphagia screen  -further medical work up as per ED attending normal/sensation intact/responds to pain/responds to verbal commands/cranial nerves intact negative

## 2024-06-05 ENCOUNTER — APPOINTMENT (RX ONLY)
Dept: URBAN - METROPOLITAN AREA CLINIC 133 | Facility: CLINIC | Age: 69
Setting detail: DERMATOLOGY
End: 2024-06-05

## 2024-06-05 DIAGNOSIS — L85.3 XEROSIS CUTIS: ICD-10-CM

## 2024-06-05 DIAGNOSIS — D18.0 HEMANGIOMA: ICD-10-CM

## 2024-06-05 DIAGNOSIS — Z85.828 PERSONAL HISTORY OF OTHER MALIGNANT NEOPLASM OF SKIN: ICD-10-CM

## 2024-06-05 DIAGNOSIS — L81.4 OTHER MELANIN HYPERPIGMENTATION: ICD-10-CM

## 2024-06-05 DIAGNOSIS — Z71.89 OTHER SPECIFIED COUNSELING: ICD-10-CM

## 2024-06-05 DIAGNOSIS — L82.1 OTHER SEBORRHEIC KERATOSIS: ICD-10-CM

## 2024-06-05 DIAGNOSIS — D22 MELANOCYTIC NEVI: ICD-10-CM

## 2024-06-05 PROBLEM — D18.01 HEMANGIOMA OF SKIN AND SUBCUTANEOUS TISSUE: Status: ACTIVE | Noted: 2024-06-05

## 2024-06-05 PROBLEM — D22.5 MELANOCYTIC NEVI OF TRUNK: Status: ACTIVE | Noted: 2024-06-05

## 2024-06-05 PROCEDURE — ? TREATMENT REGIMEN

## 2024-06-05 PROCEDURE — 99213 OFFICE O/P EST LOW 20 MIN: CPT

## 2024-06-05 PROCEDURE — ? KOH PREP

## 2024-06-05 PROCEDURE — ? COUNSELING

## 2024-06-05 PROCEDURE — ? OBSERVATION

## 2024-06-05 ASSESSMENT — LOCATION DETAILED DESCRIPTION DERM
LOCATION DETAILED: LEFT CENTRAL ZYGOMA
LOCATION DETAILED: LEFT INFERIOR UPPER BACK
LOCATION DETAILED: RIGHT PERIANAL SKIN
LOCATION DETAILED: LEFT MID TEMPLE
LOCATION DETAILED: RIGHT MEDIAL UPPER BACK
LOCATION DETAILED: SUPERIOR LUMBAR SPINE
LOCATION DETAILED: SUPERIOR THORACIC SPINE
LOCATION DETAILED: INFERIOR THORACIC SPINE

## 2024-06-05 ASSESSMENT — LOCATION ZONE DERM
LOCATION ZONE: FACE
LOCATION ZONE: ANUS
LOCATION ZONE: TRUNK

## 2024-06-05 ASSESSMENT — LOCATION SIMPLE DESCRIPTION DERM
LOCATION SIMPLE: LEFT UPPER BACK
LOCATION SIMPLE: ANUS
LOCATION SIMPLE: RIGHT UPPER BACK
LOCATION SIMPLE: UPPER BACK
LOCATION SIMPLE: LEFT TEMPLE
LOCATION SIMPLE: LOWER BACK
LOCATION SIMPLE: LEFT ZYGOMA

## 2024-06-05 NOTE — HPI: EVALUATION OF SKIN LESION(S)
What Type Of Note Output Would You Prefer (Optional)?: Standard Output
Hpi Title: Evaluation of Skin Lesions
How Severe Are Your Spot(S)?: mild
Additional History: FBSE. C/o birthmark near anus present for years monitors,

## 2024-06-05 NOTE — PROCEDURE: TREATMENT REGIMEN
Samples Given: Eucerin advanced repair cream
Discontinue Regimen: Loofah
Detail Level: Zone
Initiate Treatment: Limit showers to luke water warm \\nLimit soap to odorous areas only \\nApply cream to damp skin after cleansing ( recommended Eucerin advanced repair)
Otc Regimen: Eucerin advanced repair

## 2024-06-05 NOTE — PROCEDURE: COUNSELING
Detail Level: Generalized
Patient Specific Counseling (Will Not Stick From Patient To Patient): Continue ISDIN spf.
Detail Level: Detailed

## 2025-01-16 ENCOUNTER — APPOINTMENT (OUTPATIENT)
Dept: URBAN - METROPOLITAN AREA CLINIC 133 | Facility: CLINIC | Age: 70
Setting detail: DERMATOLOGY
End: 2025-01-16

## 2025-01-16 DIAGNOSIS — Z71.89 OTHER SPECIFIED COUNSELING: ICD-10-CM

## 2025-01-16 DIAGNOSIS — D22 MELANOCYTIC NEVI: ICD-10-CM

## 2025-01-16 DIAGNOSIS — L82.1 OTHER SEBORRHEIC KERATOSIS: ICD-10-CM

## 2025-01-16 DIAGNOSIS — D18.0 HEMANGIOMA: ICD-10-CM

## 2025-01-16 DIAGNOSIS — L82.0 INFLAMED SEBORRHEIC KERATOSIS: ICD-10-CM

## 2025-01-16 DIAGNOSIS — L81.4 OTHER MELANIN HYPERPIGMENTATION: ICD-10-CM

## 2025-01-16 PROBLEM — D22.61 MELANOCYTIC NEVI OF RIGHT UPPER LIMB, INCLUDING SHOULDER: Status: ACTIVE | Noted: 2025-01-16

## 2025-01-16 PROBLEM — D22.5 MELANOCYTIC NEVI OF TRUNK: Status: ACTIVE | Noted: 2025-01-16

## 2025-01-16 PROBLEM — D22.72 MELANOCYTIC NEVI OF LEFT LOWER LIMB, INCLUDING HIP: Status: ACTIVE | Noted: 2025-01-16

## 2025-01-16 PROBLEM — D22.71 MELANOCYTIC NEVI OF RIGHT LOWER LIMB, INCLUDING HIP: Status: ACTIVE | Noted: 2025-01-16

## 2025-01-16 PROBLEM — D18.01 HEMANGIOMA OF SKIN AND SUBCUTANEOUS TISSUE: Status: ACTIVE | Noted: 2025-01-16

## 2025-01-16 PROCEDURE — 99213 OFFICE O/P EST LOW 20 MIN: CPT

## 2025-01-16 PROCEDURE — ? ADDITIONAL NOTES

## 2025-01-16 PROCEDURE — ? COUNSELING

## 2025-01-16 PROCEDURE — ? TREATMENT REGIMEN

## 2025-01-16 ASSESSMENT — LOCATION SIMPLE DESCRIPTION DERM
LOCATION SIMPLE: RIGHT UPPER BACK
LOCATION SIMPLE: LEFT LOWER BACK
LOCATION SIMPLE: LEFT FOREARM
LOCATION SIMPLE: LEFT CHEEK
LOCATION SIMPLE: LEFT PRETIBIAL REGION
LOCATION SIMPLE: RIGHT INNER THIGH
LOCATION SIMPLE: LEFT UPPER BACK
LOCATION SIMPLE: RIGHT FOREARM
LOCATION SIMPLE: ABDOMEN

## 2025-01-16 ASSESSMENT — LOCATION DETAILED DESCRIPTION DERM
LOCATION DETAILED: LEFT PROXIMAL DORSAL FOREARM
LOCATION DETAILED: LEFT MEDIAL UPPER BACK
LOCATION DETAILED: LEFT INFERIOR CENTRAL MALAR CHEEK
LOCATION DETAILED: PERIUMBILICAL SKIN
LOCATION DETAILED: RIGHT POSTERIOR THIGH
LOCATION DETAILED: LEFT PROXIMAL PRETIBIAL REGION
LOCATION DETAILED: LEFT INFERIOR LATERAL MIDBACK
LOCATION DETAILED: RIGHT INFERIOR UPPER BACK
LOCATION DETAILED: RIGHT PROXIMAL DORSAL FOREARM
LOCATION DETAILED: RIGHT PROXIMAL RADIAL DORSAL FOREARM

## 2025-01-16 ASSESSMENT — LOCATION ZONE DERM
LOCATION ZONE: FACE
LOCATION ZONE: LEG
LOCATION ZONE: TRUNK
LOCATION ZONE: ARM

## 2025-01-16 NOTE — HPI: EVALUATION OF SKIN LESION(S)
What Type Of Note Output Would You Prefer (Optional)?: Standard Output
Hpi Title: Evaluation of Skin Lesions
Additional History: FBSE. Potential AK on left medial calf. ISK on right rib cage, bleeds occasionally. Mole to the left of her anus that she has had since birth but wants checked

## 2025-05-12 ENCOUNTER — APPOINTMENT (OUTPATIENT)
Dept: URBAN - METROPOLITAN AREA CLINIC 133 | Facility: CLINIC | Age: 70
Setting detail: DERMATOLOGY
End: 2025-05-12

## 2025-05-12 DIAGNOSIS — L57.0 ACTINIC KERATOSIS: ICD-10-CM

## 2025-05-12 DIAGNOSIS — L30.0 NUMMULAR DERMATITIS: ICD-10-CM

## 2025-05-12 DIAGNOSIS — L82.1 OTHER SEBORRHEIC KERATOSIS: ICD-10-CM

## 2025-05-12 DIAGNOSIS — L30.9 DERMATITIS, UNSPECIFIED: ICD-10-CM

## 2025-05-12 PROCEDURE — 17000 DESTRUCT PREMALG LESION: CPT

## 2025-05-12 PROCEDURE — ? ADDITIONAL NOTES

## 2025-05-12 PROCEDURE — ? LIQUID NITROGEN

## 2025-05-12 PROCEDURE — ? COUNSELING

## 2025-05-12 PROCEDURE — 99213 OFFICE O/P EST LOW 20 MIN: CPT | Mod: 25

## 2025-05-12 ASSESSMENT — LOCATION SIMPLE DESCRIPTION DERM
LOCATION SIMPLE: LEFT HAND
LOCATION SIMPLE: RIGHT HAND
LOCATION SIMPLE: LEFT HAND
LOCATION SIMPLE: LEFT TEMPLE

## 2025-05-12 ASSESSMENT — LOCATION ZONE DERM
LOCATION ZONE: FACE
LOCATION ZONE: HAND
LOCATION ZONE: HAND

## 2025-05-12 ASSESSMENT — LOCATION DETAILED DESCRIPTION DERM
LOCATION DETAILED: RIGHT DORSAL MIDDLE METACARPOPHALANGEAL JOINT
LOCATION DETAILED: LEFT RADIAL DORSAL HAND
LOCATION DETAILED: LEFT ULNAR DORSAL HAND
LOCATION DETAILED: LEFT MID TEMPLE
LOCATION DETAILED: RIGHT RADIAL DORSAL HAND

## 2025-05-12 NOTE — PROCEDURE: ADDITIONAL NOTES
Additional Notes: KOH neg, scraped due to questionable jet with contrail sign; but not itchy. 
Render Risk Assessment In Note?: no
Detail Level: Simple
Additional Notes: SElf resolving, monitor for AK